# Patient Record
Sex: FEMALE | Race: BLACK OR AFRICAN AMERICAN | NOT HISPANIC OR LATINO | URBAN - METROPOLITAN AREA
[De-identification: names, ages, dates, MRNs, and addresses within clinical notes are randomized per-mention and may not be internally consistent; named-entity substitution may affect disease eponyms.]

---

## 2019-10-08 ENCOUNTER — APPOINTMENT (OUTPATIENT)
Dept: URBAN - METROPOLITAN AREA CLINIC 217 | Age: 79
Setting detail: DERMATOLOGY
End: 2019-10-09

## 2019-10-08 DIAGNOSIS — L80 VITILIGO: ICD-10-CM

## 2019-10-08 PROCEDURE — OTHER DIAGNOSIS COMMENT: OTHER

## 2019-10-08 PROCEDURE — OTHER PRESCRIPTION: OTHER

## 2019-10-08 PROCEDURE — OTHER TREATMENT REGIMEN: OTHER

## 2019-10-08 PROCEDURE — OTHER COUNSELING: OTHER

## 2019-10-08 PROCEDURE — OTHER ORDER TESTS: OTHER

## 2019-10-08 PROCEDURE — OTHER MIPS QUALITY: OTHER

## 2019-10-08 PROCEDURE — 99203 OFFICE O/P NEW LOW 30 MIN: CPT

## 2019-10-08 RX ORDER — TACROLIMUS 1 MG/G
OINTMENT TOPICAL
Qty: 1 | Refills: 3 | Status: ERX | COMMUNITY
Start: 2019-10-08

## 2019-10-08 ASSESSMENT — SEVERITY VITILIGO: VITILIGO SEVERITY: 2

## 2019-10-08 NOTE — PROCEDURE: MIPS QUALITY
Detail Level: Detailed
Quality 226: Preventive Care And Screening: Tobacco Use: Screening And Cessation Intervention: Patient screened for tobacco use and is an ex/non-smoker
Quality 474: Zoster Vaccination Status: Shingrix Vaccination not Administered or Previously Received, Reason not Otherwise Specified
Quality 130: Documentation Of Current Medications In The Medical Record: Current Medications Documented
Quality 110: Preventive Care And Screening: Influenza Immunization: Influenza Immunization Administered during Influenza season
Quality 111:Pneumonia Vaccination Status For Older Adults: Pneumococcal Vaccination not Administered or Previously Received, Reason not Otherwise Specified

## 2019-10-08 NOTE — PROCEDURE: TREATMENT REGIMEN
Detail Level: Zone
Initiate Treatment: She may want to try conservative management so OK with Rx of tacrolimus ointment 0.1%.  She may not pursue however since limited to most vulva.
Plan: We discussed reasonable options topically and pros and cons.  Due to only having mainly on vulva, mutually agree to monitor but she might try tacrolimus ointment.  She will RTO if progresses and will re-consider management.   She is mostly relieved to know vitiligo is non-cancerous.\\nI also emphasized it was not contagious.  She appreciated this context of her skin disease.\\nAlthough not clinically worrisome, we mutually agreed to screen for other autoimmune disorders such as lupus and thyroid disease.  Lab tests given.

## 2019-10-08 NOTE — HPI: DISCOLORATION (VITILIGO)
How Severe Is It?: moderate
Is This A New Presentation, Or A Follow-Up?: Discoloration
Additional History: Patient noticed discoloration around the vagina, she went to her OBGYN who referred her to Dr. Latif, no treatments have been done or tried at this point. Patient is unaware of when discoloration started.